# Patient Record
Sex: MALE | Race: AMERICAN INDIAN OR ALASKA NATIVE
[De-identification: names, ages, dates, MRNs, and addresses within clinical notes are randomized per-mention and may not be internally consistent; named-entity substitution may affect disease eponyms.]

---

## 2022-09-01 ENCOUNTER — HOSPITAL ENCOUNTER (EMERGENCY)
Dept: HOSPITAL 5 - ED | Age: 52
Discharge: HOME | End: 2022-09-01
Payer: COMMERCIAL

## 2022-09-01 VITALS — SYSTOLIC BLOOD PRESSURE: 163 MMHG | DIASTOLIC BLOOD PRESSURE: 100 MMHG

## 2022-09-01 DIAGNOSIS — I10: ICD-10-CM

## 2022-09-01 DIAGNOSIS — R33.9: Primary | ICD-10-CM

## 2022-09-01 PROCEDURE — 51702 INSERT TEMP BLADDER CATH: CPT

## 2022-09-01 PROCEDURE — 99282 EMERGENCY DEPT VISIT SF MDM: CPT

## 2022-09-01 NOTE — EMERGENCY DEPARTMENT REPORT
ED Dysuria HPI





- HPI


Chief Complaint: Urogenital-Male


Stated Complaint: CANT URINATE


Time Seen by Provider: 09/01/22 08:13


Duration: Today


Severity: Mild


Symptoms: Dysuria: No, Frequency: No, Suprapubic Pain: No, Flank Pain: No, 

Fever: No, Hematuria: No, Abdominal Pain: No, Previous UTI's: No


Other History: s52 yo comes in with urinary retention. History of the same. He 

has not seen urology. De [place] Cruz placed and a liter plus of urine came out. Clear, 

yellow, no blood.  not on any home meds at this time.  he has appnt with pcp 

scheduled to get his bp meds.  hx a/c htn- no cp/sob/headace; he is neuro intact





ED Review of Systems


ROS: 


Stated complaint: CANT URINATE


Other details as noted in HPI





Comment: All other systems reviewed and negative





ED Past Medical Hx





- Past Medical History


Previous Medical History?: Yes


Hx Hypertension: Yes


Additional medical history: BPH





- Surgical History


Past Surgical History?: No





- Family History


Family history: no significant





- Social History


Smoking Status: Never Smoker


Substance Use Type: Alcohol





Dysuria Exam





- Exam


General: 


Vital signs noted. No distress. Alert and acting appropriately.





Exam: Yes Moist Mucous Membranes, No CVA Tenderness, No Abdominal Tenderness, No

Rigidity or Guarding





ED Course


                                   Vital Signs











  09/01/22





  06:32


 


Temperature 97.6 F


 


Pulse Rate 124 H


 


Respiratory 16





Rate 


 


Blood Pressure 163/100


 


O2 Sat by Pulse 90





Oximetry 














ED Medical Decision Making





- Medical Decision Making





                                   Vital Signs











  09/01/22





  06:32


 


Temperature 97.6 F


 


Pulse Rate 124 H


 


Respiratory 16





Rate 


 


Blood Pressure 163/100


 


O2 Sat by Pulse 90





Oximetry 








de la cruz placed


pain relieved


urine clear


no fever/chills 





ua sent


urology can follow results





will dc home with de la cruz and uro follow up





given htn and de la cruz I am not placing de la cruz at this time





denies cp sob or headache





ambulatory





dc home with dc plan of care including diet, meds, activity and follow up


Critical Care Time: No


Critical care attestation.: 


If time is entered above; I have spent that time in minutes in the direct care 

of this critically ill patient, excluding procedure time.








ED Disposition


Clinical Impression: 


 Urinary retention, Chronic hypertension





Disposition: 01 HOME / SELF CARE / HOMELESS


Is pt being admited?: No


Does the pt Need Aspirin: No


Condition: Stable


Instructions:  Hypertension (ED), Managing Your Hypertension


Additional Instructions: 


follow up with urology asap


referral below





follow up with pcp for bp


i've given you another referral below





de la cruz as instructed to stay in until taken out by urology


Referrals: 


SRIKANTH ANDRADE MD [Staff Physician] - 3-5 Days


JANIS SIM MD [Staff Physician] - 3-5 Days


Forms:  Work/School Release Form(ED)


Time of Disposition: 08:32